# Patient Record
(demographics unavailable — no encounter records)

---

## 2025-06-12 NOTE — ASSESSMENT
[FreeTextEntry1] : 20-year-old female with intermittent asthma, borderline mild FERCHO  Patient presents for follow-up.  Reports use of Trelegy daily with reliable relief.   Has lost 12 to 15 pounds since last visit, states she is no longer snoring.  On exam, lungs are clear, no wheezing or rhonchi, in NAD. Care plans discussed - discussed continued weight loss, healthy eating/nutrition and daily activity. Will continue Trelegy (100mcg/62.5mcg/25 mcg) 1 puff Daily for management of her Asthma. Will schedule PFT/NIOX this week. Follow-up in 6months.

## 2025-06-12 NOTE — HISTORY OF PRESENT ILLNESS
[Never] : never [TextBox_4] : SALAZAR RODNEY is being seen for a follow-up visit for asthma and sleep apnea.